# Patient Record
Sex: FEMALE | Race: NATIVE HAWAIIAN OR OTHER PACIFIC ISLANDER | URBAN - METROPOLITAN AREA
[De-identification: names, ages, dates, MRNs, and addresses within clinical notes are randomized per-mention and may not be internally consistent; named-entity substitution may affect disease eponyms.]

---

## 2021-05-17 ENCOUNTER — OFFICE VISIT (OUTPATIENT)
Dept: URBAN - METROPOLITAN AREA CLINIC 32 | Facility: CLINIC | Age: 51
End: 2021-05-17
Payer: COMMERCIAL

## 2021-05-17 DIAGNOSIS — H25.13 CATARACT - NSC: ICD-10-CM

## 2021-05-17 DIAGNOSIS — E11.3513 TYPE 2 DIABETES MELLITUS W/ PROLIFERATIVE DIABETIC RETINOPATHY W/ MACULAR EDEMA, BILATERAL: Primary | ICD-10-CM

## 2021-05-17 PROCEDURE — 99204 OFFICE O/P NEW MOD 45 MIN: CPT | Performed by: OPTOMETRIST

## 2021-05-17 ASSESSMENT — VISUAL ACUITY: OS: 20/40

## 2021-05-17 ASSESSMENT — INTRAOCULAR PRESSURE
OD: 15
OS: 17

## 2021-05-17 ASSESSMENT — KERATOMETRY
OS: 44.75
OD: 45.00

## 2021-05-17 NOTE — IMPRESSION/PLAN
Impression: Type 2 diabetes mellitus w/ proliferative diabetic retinopathy w/ macular edema, bilateral: U24.4746.  Plan: refer for eval with Dr [place]am[place] has significant retinopathy

## 2021-05-17 NOTE — IMPRESSION/PLAN
Impression: Cataract - NSC: H25.13. Plan: Cataracts account for the patient's complaints. Patient education was discussed regarding cataracts, lens options and surgery. Recommend Cataract consultation with surgeon. Order A-Scan and Corneal Topography. Hold off on filling any new glasses prescriptions until after the consultation. RTC 1-2 weeks for cataract consultation.  after retina eval

## 2021-06-10 ENCOUNTER — OFFICE VISIT (OUTPATIENT)
Dept: URBAN - METROPOLITAN AREA CLINIC 33 | Facility: CLINIC | Age: 51
End: 2021-06-10
Payer: COMMERCIAL

## 2021-06-10 DIAGNOSIS — E11.3392 TYPE 2 DIAB W MODERATE NONPRLF DIAB RTNOP W/O MACULAR EDEMA, LEFT EYE: ICD-10-CM

## 2021-06-10 DIAGNOSIS — H43.11 VITREOUS HEMORRHAGE, RIGHT EYE: ICD-10-CM

## 2021-06-10 DIAGNOSIS — H25.11 AGE-RELATED NUCLEAR CATARACT, RIGHT EYE: ICD-10-CM

## 2021-06-10 PROCEDURE — 99204 OFFICE O/P NEW MOD 45 MIN: CPT | Performed by: OPHTHALMOLOGY

## 2021-06-10 PROCEDURE — 92134 CPTRZ OPH DX IMG PST SGM RTA: CPT | Performed by: OPHTHALMOLOGY

## 2021-06-10 RX ORDER — OFLOXACIN 3 MG/ML
0.3 % SOLUTION/ DROPS OPHTHALMIC
Qty: 5 | Refills: 3 | Status: INACTIVE
Start: 2021-06-10 | End: 2021-07-12

## 2021-06-10 RX ORDER — PREDNISOLONE ACETATE 10 MG/ML
1 % SUSPENSION/ DROPS OPHTHALMIC
Qty: 10 | Refills: 5 | Status: INACTIVE
Start: 2021-06-10 | End: 2021-09-02

## 2021-06-10 ASSESSMENT — INTRAOCULAR PRESSURE
OD: 15
OS: 15

## 2021-06-10 NOTE — IMPRESSION/PLAN
Impression: Vitreous hemorrhage associated with PDR, right eye: H43.11 Right. Condition: unstable. Vision: vision affected. Plan: Discussed diagnosis in detail with patient. Discussed risks of progression. Recommend surgery OD - see notes above.

## 2021-06-10 NOTE — IMPRESSION/PLAN
Impression: Age-related nuclear cataract, right eye: H25.11 Right. Condition: worsening. Vision: vision affected. Plan: Discussed diagnosis in detail with patient. Surgical treatment is required. See notes above.

## 2021-06-10 NOTE — IMPRESSION/PLAN
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 Left. Condition: stable. Vision: vision affected. Diabetic since 2003 No prior treatment Plan: Discussed diagnosis in detail with patient. Exam shows minimal Diabetic changes. No treatment is recommended at this time. Emphasized blood sugar control and advised to keep future appointments with PCP and/or Endocrinologist for the management of Diabetes. Recommend observation for now.  OCT shows stable no active edema OS

## 2021-06-10 NOTE — IMPRESSION/PLAN
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 Condition: unstable. Vision: vision affected. Diabetic since 2003 Not prior treatments Plan: Discussed diagnosis in detail with patient. Exam of the right eye shows 3+ NS, vitreous hemorrhage, TRD involving macula hazy view. Discussed risks of progression. Surgical treatment is recommended to repair the retina with cataract extraction  PPVx w/ CE w/ PCIOL OD . May need to use Gas or S. O. to repair the retina, will decide how to proceed after removing the blood and inspecting the retina. Surgical risks and benefits were discussed, explained and understood by patient. Unable to tell how much vision will be recovered. Discussed gas bubble and post-op care: no traveling, flying or high altitude for approximately 6 - 8 weeks. If S.O. is used, patient will need additional surgery in the future once the retina is stable. All questions answered. Patient elects to proceed with recommendation. RL1. Educational material provided to patient.  *** ASCAN PRIOR TO SURGERY***  Erx qtts to patient's pharmacy: Ofloxacin and Prednisolone    OCT shows TRD OD

## 2021-06-17 ENCOUNTER — TESTING ONLY (OUTPATIENT)
Dept: URBAN - METROPOLITAN AREA CLINIC 32 | Facility: CLINIC | Age: 51
End: 2021-06-17
Payer: COMMERCIAL

## 2021-06-17 DIAGNOSIS — E11.3521 TYPE 2 DIABETES MELLITUS WITH PROLIFERATIVE DIABETIC RETINOPATHY WITH TRACTION RETINAL DETACHMENT INVOLVING THE MACULA, RIGHT EYE: Primary | ICD-10-CM

## 2021-06-17 PROCEDURE — 76519 ECHO EXAM OF EYE: CPT | Performed by: OPHTHALMOLOGY

## 2021-06-17 ASSESSMENT — PACHYMETRY
OS: 3.45
OS: 23.07
OD: 23.46
OD: 3.43

## 2021-06-30 ENCOUNTER — SURGERY (OUTPATIENT)
Dept: URBAN - METROPOLITAN AREA SURGERY 15 | Facility: SURGERY | Age: 51
End: 2021-06-30
Payer: COMMERCIAL

## 2021-06-30 PROCEDURE — 67113 REPAIR RETINAL DETACH CPLX: CPT | Performed by: OPHTHALMOLOGY

## 2021-07-01 ENCOUNTER — POST-OPERATIVE VISIT (OUTPATIENT)
Dept: URBAN - METROPOLITAN AREA CLINIC 32 | Facility: CLINIC | Age: 51
End: 2021-07-01

## 2021-07-01 ASSESSMENT — INTRAOCULAR PRESSURE
OS: 20
OD: 20

## 2021-07-01 NOTE — IMPRESSION/PLAN
Impression: S/P [id number]; 03109; 25G PARS PLANA VITRECTOMY; Laser photocoagulation: ENDO; Intraocular Injections of gas C3F8 18%; LIMBAL LENSECTOMY WITH PC IOL IMPLANT; Intravitreal injection of medications: KENALOG OD - 1 Day. Encounter for surgical aftercare following surgery on a sense organ  Z48.810. Post operative instructions reviewed - Condition is improving - Plan: --Continue Ofloxacin 0.3% QID x1 week then d/c. --Finish Prednisolone acetate 1% QID until both bottles are empty.

## 2021-07-12 ENCOUNTER — POST-OPERATIVE VISIT (OUTPATIENT)
Dept: URBAN - METROPOLITAN AREA CLINIC 32 | Facility: CLINIC | Age: 51
End: 2021-07-12
Payer: COMMERCIAL

## 2021-07-12 PROCEDURE — 99024 POSTOP FOLLOW-UP VISIT: CPT | Performed by: OPHTHALMOLOGY

## 2021-07-12 ASSESSMENT — INTRAOCULAR PRESSURE
OD: 15
OS: 15

## 2021-07-12 NOTE — IMPRESSION/PLAN
Impression: S/P [id number]; 10384; 25G PARS PLANA VITRECTOMY; Laser photocoagulation: ENDO; Intraocular Injections of gas C3F8 18%; LIMBAL LENSECTOMY WITH PC IOL IMPLANT; Intravitreal injection of medications: KENALOG OD - 12 Days. Encounter for surgical aftercare following surgery on a sense organ  Z48.810. Excellent post op course   Post operative instructions reviewed - Condition is improving - Plan: Due to Coronavirus COVID-19 pandemic and National Emergency, minimal Slit Lamp examination performed. Exam OD shows the retina is fully attached and in good position w/ gas bubble. Unable to obtain OCT OD today. No treatment is required at this time. Recommend a retina follow - up in 1 month, sooner if there is a sudden change in vision. Provided patient with a letter today to give her job that she has an upcoming appointment in 1 month to reassess her ability to return to work in terms of her vision. --Finish Prednisolone acetate 1%  QID until both bottles are empty.

## 2021-08-23 ENCOUNTER — POST-OPERATIVE VISIT (OUTPATIENT)
Dept: URBAN - METROPOLITAN AREA CLINIC 32 | Facility: CLINIC | Age: 51
End: 2021-08-23
Payer: COMMERCIAL

## 2021-08-23 DIAGNOSIS — Z48.810 ENCOUNTER FOR SURGICAL AFTERCARE FOLLOWING SURGERY ON A SENSE ORGAN: Primary | ICD-10-CM

## 2021-08-23 PROCEDURE — 99024 POSTOP FOLLOW-UP VISIT: CPT | Performed by: OPHTHALMOLOGY

## 2021-08-23 ASSESSMENT — INTRAOCULAR PRESSURE
OS: 10
OD: 7

## 2021-08-23 NOTE — IMPRESSION/PLAN
Impression: S/P [id number]; 61554; 25G PARS PLANA VITRECTOMY; Laser photocoagulation: ENDO; Intraocular Injections of gas C3F8 18%; LIMBAL LENSECTOMY WITH PC IOL IMPLANT; Intravitreal injection of medications: KENALOG OD - 54 Days. Encounter for surgical aftercare following surgery on a sense organ  Z48.810. Excellent post op course   Post operative instructions reviewed - Plan: S/P [id number]; 18812; 25G PARS PLANA VITRECTOMY; Laser photocoagulation: ENDO; Intraocular Injections of gas C3F8 18%; LIMBAL LENSECTOMY WITH PC IOL IMPLANT; Intravitreal injection of medications: KENALOG OD - 54 Days. Encounter for surgical aftercare following surgery on a sense organ  Z48.810. Excellent post op course   Post operative instructions reviewed - Discussed there is a hazy view OD and she will need to return to [address] [place] [address] or [facility] for a BSCAN and possible repeat of SX. --Advised patient to use artificial tears for comfort. --Continue Prednisolone acetate 1%

## 2021-09-02 ENCOUNTER — OFFICE VISIT (OUTPATIENT)
Dept: URBAN - METROPOLITAN AREA CLINIC 33 | Facility: CLINIC | Age: 51
End: 2021-09-02
Payer: COMMERCIAL

## 2021-09-02 DIAGNOSIS — H33.021 RETINAL DETACHMENT WITH MULTIPLE BREAKS, RIGHT EYE: Primary | ICD-10-CM

## 2021-09-02 DIAGNOSIS — H43.311 VITREOUS MEMBRANES AND STRANDS, RIGHT EYE: ICD-10-CM

## 2021-09-02 PROCEDURE — 99214 OFFICE O/P EST MOD 30 MIN: CPT | Performed by: OPHTHALMOLOGY

## 2021-09-02 PROCEDURE — 76512 OPH US DX B-SCAN: CPT | Performed by: OPHTHALMOLOGY

## 2021-09-02 RX ORDER — PREDNISOLONE ACETATE 10 MG/ML
1 % SUSPENSION/ DROPS OPHTHALMIC
Qty: 10 | Refills: 5 | Status: ACTIVE
Start: 2021-09-02

## 2021-09-02 RX ORDER — OFLOXACIN 3 MG/ML
0.3 % SOLUTION/ DROPS OPHTHALMIC
Qty: 5 | Refills: 3 | Status: ACTIVE
Start: 2021-09-02

## 2021-09-02 NOTE — IMPRESSION/PLAN
Impression: Vitreous membranes and strands, right eye: H43.311. Right. Condition: new problem addtl w/u needed. Vision: vision affected. Plan: Advised patient of condition. Discussed diagnosis in detail with patient. Surgical treatment is required, PPVx RIGHT EYE. See notes above.

## 2021-09-02 NOTE — IMPRESSION/PLAN
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 Condition: unstable. Vision: vision affected. Diabetic since 2003
s/p PPVx, Endo, C3F8, Limbal Lensectomy, Shola POLLACK OD 6/30/2021 Plan: Advised patient of condition. Discussed diagnosis in detail with patient. Discussed treatment options with patient. See notes above.

## 2021-09-02 NOTE — IMPRESSION/PLAN
Impression: Retinal detachment with multiple breaks, right eye: H33.021 Right. Condition: new problem addtl w/u needed. Vision: vision affected. s/p PPVx, Endo, C3F8, Limbal Lensectomy, JAKI, Shola OD 6/30/2021 Plan: Discussed diagnosis in great detail with patient. Exam OD shows hazy view and IOL capture. B-Scan performed today confirms mobile RD. Discussed risks of progression. Surgical treatment is recommended to repair the retina PPVx RIGHT EYE. Discussed treatment options: Gas vs Oil. Based on the patient's retinal history, will proceed with surgery with S.O. Patient will need a second surgery in the future when the retina is stable to remove the heavy liquid. All questions answered. Educational material provided to patient. RL1. Patient elects to proceed with recommendation.  ERxed drops to patients pharmacy on file: Ofloxacin and Prednisolone

## 2021-09-08 ENCOUNTER — SURGERY (OUTPATIENT)
Dept: URBAN - METROPOLITAN AREA SURGERY 15 | Facility: SURGERY | Age: 51
End: 2021-09-08
Payer: COMMERCIAL

## 2021-09-08 PROCEDURE — 67113 REPAIR RETINAL DETACH CPLX: CPT | Performed by: OPHTHALMOLOGY

## 2021-09-09 ENCOUNTER — POST-OPERATIVE VISIT (OUTPATIENT)
Dept: URBAN - METROPOLITAN AREA CLINIC 32 | Facility: CLINIC | Age: 51
End: 2021-09-09

## 2021-09-09 PROCEDURE — 99024 POSTOP FOLLOW-UP VISIT: CPT | Performed by: OPTOMETRIST

## 2021-09-09 ASSESSMENT — INTRAOCULAR PRESSURE
OD: 14
OS: 10

## 2021-09-09 NOTE — IMPRESSION/PLAN
Impression: S/P 88320; Posterior Vitrectomy; AFX (Air Fluid Gas Exchange); Epiretinal Membranectomy; Silicone oil injection OD - 1 Day. Encounter for surgical aftercare following surgery on a sense organ  Z48.810. Post operative instructions reviewed - Plan: The surgical eye(s) is improving well. Continue to follow current drop plan and post operative instructions. Recommend artificial tears throughout post operative period. RTC for scheduled follow up. --Advised patient to use artificial tears for comfort. 
--Continue Pred-Acetate QID OD
--Continue Ofloxacin 0.3% QID OD

## 2021-09-16 ENCOUNTER — POST-OPERATIVE VISIT (OUTPATIENT)
Dept: URBAN - METROPOLITAN AREA CLINIC 33 | Facility: CLINIC | Age: 51
End: 2021-09-16

## 2021-09-16 PROCEDURE — 99024 POSTOP FOLLOW-UP VISIT: CPT | Performed by: OPHTHALMOLOGY

## 2021-09-16 ASSESSMENT — INTRAOCULAR PRESSURE
OD: 10
OS: 13

## 2021-09-16 NOTE — IMPRESSION/PLAN
Impression: S/P 73026; Posterior Vitrectomy; AFX (Air Fluid Gas Exchange); Epiretinal Membranectomy; Silicone oil injection OD - 8 Days. Encounter for surgical aftercare following surgery on a sense organ  Z48.810. Plan: Due to Coronavirus COVID-19 pandemic and National Emergency, minimal Slit Lamp examination performed. Exam OD shows the retina appears in position, posterior capsule fiber. OCT shows hazy view. No treatment is required at this time. Recommend a retina follow - up in 3 weeks, sooner if there is a sudden change. Advised patient she may sleep on her stomach again.  --Continue Prednisolone acetate 1% increase to 8x daily --Discontinue Ofloxacin 0.3% Skin extremely pale; dry.  prolonged cap refill.

## 2021-10-25 ENCOUNTER — POST-OPERATIVE VISIT (OUTPATIENT)
Dept: URBAN - METROPOLITAN AREA CLINIC 33 | Facility: CLINIC | Age: 51
End: 2021-10-25
Payer: COMMERCIAL

## 2021-10-25 PROCEDURE — 99024 POSTOP FOLLOW-UP VISIT: CPT | Performed by: OPTOMETRIST

## 2021-10-25 ASSESSMENT — INTRAOCULAR PRESSURE
OS: 10
OD: 16

## 2021-10-25 NOTE — IMPRESSION/PLAN
Impression: S/P 84151; Posterior Vitrectomy; AFX (Air Fluid Gas Exchange); Epiretinal Membranectomy; Silicone oil injection OD - 47 Days. Encounter for surgical aftercare following surgery on a sense organ  Z48.810.  Post operative instructions reviewed - Plan: Keep appointment with Dr. Denise Cameron --Continue Pred-Acetate